# Patient Record
Sex: FEMALE | Race: WHITE | NOT HISPANIC OR LATINO | Employment: FULL TIME | URBAN - METROPOLITAN AREA
[De-identification: names, ages, dates, MRNs, and addresses within clinical notes are randomized per-mention and may not be internally consistent; named-entity substitution may affect disease eponyms.]

---

## 2020-03-22 ENCOUNTER — NURSE TRIAGE (OUTPATIENT)
Dept: ADMINISTRATIVE | Facility: CLINIC | Age: 40
End: 2020-03-22

## 2020-03-22 NOTE — TELEPHONE ENCOUNTER
Pt reports cough w/ low grade fever. +myalgia, rash (last week) and fatigue. Reports possible exposure to Covid-19, as she was in [place] for [place]. She questions if she should get tested for the virus. She was offered [facility] and accepts. She was also given ED precautions and will call back with any questions/concerns.      Reason for Disposition   [1] Cough occurs AND [2] within 14 days of COVID-19 EXPOSURE    Additional Information   Negative: Severe difficulty breathing (e.g., struggling for each breath, speak in single words, bluish lips)   Negative: Sounds like a life-threatening emergency to the triager   Negative: [1] Difficulty breathing occurs AND [2] within 14 days of COVID-19 EXPOSURE (Close Contact)   Negative: Patient sounds very sick or weak to the triager    Protocols used: CORONAVIRUS (COVID-19) EXPOSURE-A-AH